# Patient Record
Sex: MALE | Race: WHITE | Employment: FULL TIME | URBAN - METROPOLITAN AREA
[De-identification: names, ages, dates, MRNs, and addresses within clinical notes are randomized per-mention and may not be internally consistent; named-entity substitution may affect disease eponyms.]

---

## 2021-02-21 ENCOUNTER — APPOINTMENT (OUTPATIENT)
Dept: GENERAL RADIOLOGY | Age: 26
End: 2021-02-21

## 2021-02-21 ENCOUNTER — HOSPITAL ENCOUNTER (EMERGENCY)
Age: 26
Discharge: HOME | End: 2021-02-21

## 2021-02-21 VITALS
RESPIRATION RATE: 18 BRPM | HEART RATE: 78 BPM | BODY MASS INDEX: 29.64 KG/M2 | TEMPERATURE: 97.8 F | HEIGHT: 68 IN | OXYGEN SATURATION: 98 % | DIASTOLIC BLOOD PRESSURE: 67 MMHG | SYSTOLIC BLOOD PRESSURE: 149 MMHG | WEIGHT: 195.55 LBS

## 2021-02-21 DIAGNOSIS — S22.32XA CLOSED FRACTURE OF ONE RIB OF LEFT SIDE, INITIAL ENCOUNTER: Primary | ICD-10-CM

## 2021-02-21 PROCEDURE — 6370000000 HC RX 637 (ALT 250 FOR IP): Performed by: PHYSICIAN ASSISTANT

## 2021-02-21 PROCEDURE — 71101 X-RAY EXAM UNILAT RIBS/CHEST: CPT

## 2021-02-21 PROCEDURE — 99283 EMERGENCY DEPT VISIT LOW MDM: CPT

## 2021-02-21 RX ORDER — ACETAMINOPHEN AND CODEINE PHOSPHATE 300; 30 MG/1; MG/1
1 TABLET ORAL EVERY 6 HOURS PRN
Qty: 12 TABLET | Refills: 0 | Status: SHIPPED | OUTPATIENT
Start: 2021-02-21 | End: 2021-02-24

## 2021-02-21 RX ORDER — ACETAMINOPHEN 500 MG
1000 TABLET ORAL ONCE
Status: COMPLETED | OUTPATIENT
Start: 2021-02-21 | End: 2021-02-21

## 2021-02-21 RX ADMIN — ACETAMINOPHEN 1000 MG: 500 TABLET ORAL at 20:05

## 2021-02-21 ASSESSMENT — PAIN SCALES - GENERAL: PAINLEVEL_OUTOF10: 3

## 2021-02-21 ASSESSMENT — PAIN DESCRIPTION - PAIN TYPE: TYPE: ACUTE PAIN

## 2021-02-21 ASSESSMENT — PAIN DESCRIPTION - FREQUENCY: FREQUENCY: INTERMITTENT

## 2021-02-21 ASSESSMENT — PAIN - FUNCTIONAL ASSESSMENT: PAIN_FUNCTIONAL_ASSESSMENT: PREVENTS OR INTERFERES SOME ACTIVE ACTIVITIES AND ADLS

## 2021-02-21 ASSESSMENT — PAIN DESCRIPTION - ORIENTATION: ORIENTATION: LEFT

## 2021-02-22 NOTE — ED PROVIDER NOTES
**ADVANCED PRACTICE PROVIDER, I HAVE EVALUATED THIS PATIENT**        [address]      Pt Name: Jony Asif  [facility]:[id number]  [place] 1995  Date of evaluation: 2/21/2021  Provider: Sariah Lucero PA-C      Chief Complaint:    Chief Complaint   Patient presents with    Back Pain     patient states fell trying to kick a punching bag onto left back area last Friday  c/o worsening pain. Nursing Notes, Past Medical Hx, Past Surgical Hx, Social Hx, Allergies, and Family Hx were all reviewed and agreed with or any disagreements were addressed in the HPI.    HPI:  (Location, Duration, Timing, Severity, Quality, Assoc Sx, Context, Modifying factors)  This is a  23 y.o. male with complaint of relatively severe constant left posterior rib pain after an accidental fall that occurred 2 days ago at a bar. Patient states that he was kicking a punching bag and fell backwards onto those ribs. Now anytime he takes a deep breath or moves wrong he has a sharp shooting pain from the left posterior ribs. It is rather bothersome. No shortness of breath or anterior chest pain. He denies any head or neck injury. No dizziness or confusion. No abdominal pain nausea or acute urine or stool changes. No lower extremity or upper extremity acute changes. No medication taken for this so far today as patient states that he does not tolerate over-the-counter nonsteroidal anti-inflammatory as well. PastMedical/Surgical History:  History reviewed. No pertinent past medical history. History reviewed. No pertinent surgical history. Medications:  Discharge Medication List as of 2/21/2021  8:37 PM            Review of Systems:  Review of Systems   Positive as above with no fevers or chills no headache vision change neck pain or stiffness. Patient states that he has some ongoing cough from smoking. No shortness of breath or heart palpitations.   No dizziness confusion syncope or near syncope. No abdominal pain or distention or acute loss of control of bowel or bladder or saddle numbness or tingling or foot drop. No extremity acute loss of sensation or movement or rash. Positives and Pertinent negatives as per HPI. Except as noted above in the ROS, problem specific ROS was completed and is negative. Physical Exam:  Physical Exam  Vitals signs and nursing note reviewed. Constitutional:       Appearance: Normal appearance. He is not diaphoretic. HENT:      Head: Normocephalic and atraumatic. Right Ear: External ear normal.      Left Ear: External ear normal.      Nose: Nose normal.   Eyes:      General:         Right eye: No discharge. Left eye: No discharge. Conjunctiva/sclera: Conjunctivae normal.   Neck:      Musculoskeletal: Normal range of motion and neck supple. Cardiovascular:      Rate and Rhythm: Normal rate and regular rhythm. Pulses: Normal pulses. Heart sounds: Normal heart sounds. No murmur. No gallop. Pulmonary:      Effort: Pulmonary effort is normal. No tachypnea, accessory muscle usage, prolonged expiration, respiratory distress or retractions. Breath sounds: No wheezing, rhonchi or rales. Comments: No subcutaneous crepitus or emphysema. There is some tenderness on the left posterior ribs as noted above with no bruising or hematoma. No palpable bony deformity. Patient has a few scattered mucus crackles throughout lung fields however good airflow overall. Skin:     General: Skin is warm and dry. Neurological:      Mental Status: He is alert and oriented to person, place, and time.    Psychiatric:         Mood and Affect: Mood normal.         Behavior: Behavior normal.         MEDICAL DECISION MAKING    Vitals:    Vitals:    02/21/21 1905   BP: (!) 149/67   Pulse: 78   Resp: 18   Temp: 97.8 °F (36.6 °C)   TempSrc: Temporal   SpO2: 98%   Weight: 195 lb 8.8 oz (88.7 kg)   Height: 5' 8\" (1.727 m) LABS:Labs Reviewed - No data to display     Remainder of labs reviewed and werenegative at this time or not returned at the time of this note. RADIOLOGY:   Non-plain film images such as CT, Ultrasound and MRI are read by the radiologist. Kasia Schmitt PA-C have directly visualized the radiologic plain film image(s) with the below findings:    Left eighth rib does show fracture without displacement. No acute pneumothorax or hemothorax or other acute findings. Interpretation per the Radiologist below, if available at the time of this note:    XR RIBS LEFT INCLUDE CHEST (MIN 3 VIEWS)    (Results Pending)        No results found. MEDICAL DECISION MAKING / ED COURSE:      PROCEDURES:   Procedures    None    Patient was given:  Medications   acetaminophen (TYLENOL) tablet 1,000 mg (1,000 mg Oral Given 2/21/21 2005)       Patient presents as above and is given some medication for comfort here. Exam as above. He does have an x-ray series of his left ribs performed which returned as above. Findings are most consistent with rib fracture. No indication of acute pulmonary injury, closed head injury or other severe issue at this time. Conservative home care is most appropriate. The patient tolerated their visit well. I evaluated the patient. The physician was available for consultation as needed. The patient and / or the family were informed of the results of any tests, a time was given to answer questions, a plan was proposed and they agreed with plan. Drink plenty of water, use Mucinex, rib bracing, and the medication prescribed. Rest, and consider quitting smoking. Advance activity as tolerated. Follow with your family doctor for further care. Return to the ER for any emergency worsening or concern. CLINICAL IMPRESSION:  1.  Closed fracture of one rib of left side, initial encounter        DISPOSITION Decision To Discharge 02/21/2021 08:30:06 PM      PATIENT REFERRED TO:  [facility]) Pre-Services  [phone]          DISCHARGE MEDICATIONS:  Discharge Medication List as of 2/21/2021  8:37 PM      START taking these medications    Details   acetaminophen-codeine (TYLENOL/CODEINE #3) 300-30 MG per tablet Take 1 tablet by mouth every 6 hours as needed for Pain for up to 3 days. Caution, causes drowsiness. Take appropriate care.   Take lowest dose possible, Disp-12 tablet, R-0Normal             DISCONTINUED MEDICATIONS:  Discharge Medication List as of 2/21/2021  8:37 PM                 (Please note the MDM and HPI sections of this note were completed with a voice recognition program.  Efforts were made to edit the dictations but occasionally words are mis-transcribed.)    Electronically signed, Tawanda Flores PA-C,          Tawanda Flores PA-C  02/21/21 2042

## 2021-02-22 NOTE — ED NOTES
Bed: C-30  Expected date:   Expected time:   Means of arrival:   Comments:  #2     Roby Girard RN  02/21/21 1921